# Patient Record
Sex: FEMALE | Race: ASIAN
[De-identification: names, ages, dates, MRNs, and addresses within clinical notes are randomized per-mention and may not be internally consistent; named-entity substitution may affect disease eponyms.]

---

## 2017-09-03 ENCOUNTER — HOSPITAL ENCOUNTER (OUTPATIENT)
Dept: HOSPITAL 53 - M RAD | Age: 42
End: 2017-09-03
Attending: HOSPITALIST
Payer: COMMERCIAL
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
## 2017-09-03 DIAGNOSIS — S99.912A: Primary | ICD-10-CM
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
## 2017-09-03 NOTE — REP
Clinical:  Trauma.

 

Technique:  AP, lateral, bilateral oblique views of the left ankle.

 

Findings:

Lateral soft tissue swelling consistent with inversion injury.  No acute fracture

or dislocation.  Joint spaces and ankle mortise are intact.

 

Impression:

Lateral swelling.  No acute fracture or dislocation.

 

 

Signed by

Nicolás Jorge MD 09/03/2017 09:13 A

## 2019-02-28 ENCOUNTER — HOSPITAL ENCOUNTER (OUTPATIENT)
Dept: HOSPITAL 53 - M SFHCPLAZ | Age: 44
End: 2019-02-28
Attending: FAMILY MEDICINE
Payer: COMMERCIAL

## 2019-02-28 DIAGNOSIS — Z13.220: ICD-10-CM

## 2019-02-28 DIAGNOSIS — Z13.1: ICD-10-CM

## 2019-02-28 DIAGNOSIS — R21: Primary | ICD-10-CM

## 2019-02-28 DIAGNOSIS — I10: ICD-10-CM

## 2019-02-28 LAB
ALBUMIN SERPL BCG-MCNC: 4.2 GM/DL (ref 3.2–5.2)
ALT SERPL W P-5'-P-CCNC: 49 U/L (ref 12–78)
BILIRUB SERPL-MCNC: 0.4 MG/DL (ref 0.2–1)
BUN SERPL-MCNC: 12 MG/DL (ref 7–18)
CALCIUM SERPL-MCNC: 8.8 MG/DL (ref 8.5–10.1)
CHLORIDE SERPL-SCNC: 108 MEQ/L (ref 98–107)
CHOLEST SERPL-MCNC: 210 MG/DL (ref ?–200)
CHOLEST/HDLC SERPL: 4.2 {RATIO} (ref ?–5)
CO2 SERPL-SCNC: 30 MEQ/L (ref 21–32)
CREAT SERPL-MCNC: 0.61 MG/DL (ref 0.55–1.3)
EST. AVERAGE GLUCOSE BLD GHB EST-MCNC: 128 MG/DL (ref 60–110)
GFR SERPL CREATININE-BSD FRML MDRD: > 60 ML/MIN/{1.73_M2} (ref 58–?)
GLUCOSE SERPL-MCNC: 91 MG/DL (ref 70–100)
HDLC SERPL-MCNC: 50 MG/DL (ref 40–?)
LDLC SERPL CALC-MCNC: 123 MG/DL (ref ?–100)
NONHDLC SERPL-MCNC: 160 MG/DL
POTASSIUM SERPL-SCNC: 4.1 MEQ/L (ref 3.5–5.1)
PROT SERPL-MCNC: 8 GM/DL (ref 6.4–8.2)
SODIUM SERPL-SCNC: 143 MEQ/L (ref 136–145)
TRIGL SERPL-MCNC: 183 MG/DL (ref ?–150)

## 2020-02-03 ENCOUNTER — HOSPITAL ENCOUNTER (OUTPATIENT)
Dept: HOSPITAL 53 - M LAB | Age: 45
End: 2020-02-03
Attending: FAMILY MEDICINE
Payer: COMMERCIAL

## 2020-02-03 DIAGNOSIS — R76.11: ICD-10-CM

## 2020-02-03 DIAGNOSIS — Z11.1: Primary | ICD-10-CM

## 2020-02-04 NOTE — REP
Clinical:  Pulmonary tuberculosis screening .

 

Comparison: 05/03/2010 .

 

Technique:  PA and lateral.

 

Findings:

The mediastinum and cardiac silhouette are normal.  The lung fields are clear and

without acute consolidation, effusion, or pneumothorax.  The skeletal structures

are intact and normal.

 

Impression:

1.   No acute cardiopulmonary process.

 

 

Electronically Signed by

Nicolás Jorge MD 02/04/2020 02:21 A

## 2020-04-23 ENCOUNTER — HOSPITAL ENCOUNTER (OUTPATIENT)
Dept: HOSPITAL 53 - M SFHCPLAZ | Age: 45
End: 2020-04-23
Attending: FAMILY MEDICINE
Payer: COMMERCIAL

## 2020-04-23 DIAGNOSIS — R73.03: Primary | ICD-10-CM

## 2020-04-23 LAB — EST. AVERAGE GLUCOSE BLD GHB EST-MCNC: 126 MG/DL (ref 60–110)

## 2021-01-08 ENCOUNTER — HOSPITAL ENCOUNTER (OUTPATIENT)
Dept: HOSPITAL 53 - M EMP | Age: 46
End: 2021-01-08
Attending: FAMILY MEDICINE

## 2021-01-08 DIAGNOSIS — Z20.822: Primary | ICD-10-CM

## 2021-04-26 ENCOUNTER — HOSPITAL ENCOUNTER (OUTPATIENT)
Dept: HOSPITAL 53 - M SFHCPLAZ | Age: 46
End: 2021-04-26
Attending: FAMILY MEDICINE
Payer: COMMERCIAL

## 2021-04-26 DIAGNOSIS — Z13.220: ICD-10-CM

## 2021-04-26 DIAGNOSIS — R73.03: Primary | ICD-10-CM

## 2021-04-26 LAB
BUN SERPL-MCNC: 12 MG/DL (ref 7–18)
CALCIUM SERPL-MCNC: 9.4 MG/DL (ref 8.5–10.1)
CHLORIDE SERPL-SCNC: 107 MEQ/L (ref 98–107)
CHOLEST SERPL-MCNC: 233 MG/DL (ref ?–200)
CHOLEST/HDLC SERPL: 4.16 {RATIO} (ref ?–5)
CO2 SERPL-SCNC: 31 MEQ/L (ref 21–32)
CREAT SERPL-MCNC: 0.69 MG/DL (ref 0.55–1.3)
EST. AVERAGE GLUCOSE BLD GHB EST-MCNC: 120 MG/DL (ref 60–110)
GFR SERPL CREATININE-BSD FRML MDRD: > 60 ML/MIN/{1.73_M2} (ref 58–?)
GLUCOSE SERPL-MCNC: 103 MG/DL (ref 70–100)
HDLC SERPL-MCNC: 56 MG/DL (ref 40–?)
LDLC SERPL CALC-MCNC: 147 MG/DL (ref ?–100)
NONHDLC SERPL-MCNC: 177 MG/DL
POTASSIUM SERPL-SCNC: 4.5 MEQ/L (ref 3.5–5.1)
SODIUM SERPL-SCNC: 142 MEQ/L (ref 136–145)
TRIGL SERPL-MCNC: 149 MG/DL (ref ?–150)

## 2021-05-04 ENCOUNTER — HOSPITAL ENCOUNTER (OUTPATIENT)
Dept: HOSPITAL 53 - M WHC | Age: 46
End: 2021-05-04
Attending: FAMILY MEDICINE
Payer: COMMERCIAL

## 2021-05-04 DIAGNOSIS — Z92.0: ICD-10-CM

## 2021-05-04 DIAGNOSIS — Z98.82: ICD-10-CM

## 2021-05-04 DIAGNOSIS — Z12.31: Primary | ICD-10-CM

## 2021-05-04 DIAGNOSIS — R10.11: ICD-10-CM

## 2021-05-04 NOTE — REP
INDICATION:

R10.11 RUQ ABDOMINAL PAIN.



COMPARISON:

Comparison sonography May 3, 2010..



TECHNIQUE:

Right upper quadrant sonography.



FINDINGS:

Scanning through the right upper quadrant of the abdomen demonstrates a normal sized,

thin-walled gallbladder without evidence of stone or polyp.  Common bile duct is

normal measuring 0.59 cm in greatest diameter.  No focal liver lesion is seen.  Liver

size is normal.  No pancreatic abnormality is observed.  No right renal abnormality is

seen.  There is no evidence of ascites.  The right kidney measures 11.5 x 3.4 x 3.9 cm.



IMPRESSION:

Negative right upper quadrant sonography.





<Electronically signed by Sylvain Albert > 05/04/21 1028

## 2021-05-05 NOTE — REPMRS
Patient History

The patient states she had a clinical breast exam in April 2021.

 

Patient had first child at age 36.

Family history of breast cancer at age 50 or over in mother, 

breast cancer in paternal aunt.

Retro-pectoral silicone gel implants in both breasts.

Taking hormonal contraceptives for 3 years.

No breast complaints today



1st covid vaccine 12/22/2020-left arm-[employer]

2nd covid vaccine 1/12/21-left arm

Prior done 6/29/2017 @ [facility]

 

Digital Woman Screen Mammo: May 4, 2021 - Exam #: 

[id number]

Bilateral CC and MLO view(s) were taken.

 

Technologist: Shayy Mckinney, Technologist

FINDINGS: The breast tissue is extremely dense which could 

obscure a lesion on mammography.  Screening.

 

Digital screening (2D) mammography was performed bilaterally in 

the CC and MLO projections. Additionally, breast tomosynthesis 

(3D mammography) was performed bilaterally in the CC and MLO 

projections. Todays exam was compared to the prior exams(s).Both 

Soco and non-Soco views were obtained.

 

By history, the patient has no complaints of a palpable breast 

abnormality or other significant breast complaints.

 

The breasts are unchanged in size and shape. Once again, dense 

heterogenous fibroglandular elements are seen bilaterally in a 

stable appearing pattern but to such a degree that the 

sensitivity of the mammogram in detecting cancer is 

decreased.There are no jasmyne-soft tissue densities or spiculated 

masses. There is no internal architectural distortion. There are 

no suspicious jasmyne-calcific clusters. Skin thickening or nipple 

retraction is not present.

 

IMPRESSION:

 

BI-RADS Category 2- Benign Findings(s). There is no evidence of 

malignant alteration of the breasts. Followup examination 

recommended in one year.

The Volpara volumetric breast density category is D, the breasts 

are extremely dense which lowers the sensitivity of mammography.

This mammogram was read with the assistance of iCAD PowerLook 

AMP,an FDA approved computer aided detection system for 

mammography.

The lifetime Tyrer-Cuzick score is     32.8 %

Negative x-ray reports should not delay surgical consultation if 

a dominant or clinically suspicious mass is present.

 

Not all breast cancers can be identified by mammography. 

Therefore, we recommend that you continue to perform regular 

breast self-examination and physical examination and then 

promptly contact your physician of any concerns or changes.

 

Adenosis and dense breasts may obscure an underlying neoplasm.

 

Assessment: BI-RADS/ACR category 2 mammogram. Benign Findings.

 

Recommendation

Routine screening mammogram of both breasts in 1 year.

 

Electronically Signed By: Binh Franz DO 05/05/21 [id number]

## 2021-11-10 ENCOUNTER — HOSPITAL ENCOUNTER (OUTPATIENT)
Dept: HOSPITAL 53 - M EMP | Age: 46
End: 2021-11-10
Attending: FAMILY MEDICINE

## 2021-11-10 DIAGNOSIS — Z11.52: Primary | ICD-10-CM

## 2021-11-10 DIAGNOSIS — Z20.822: ICD-10-CM

## 2021-11-10 LAB — RSV RNA NPH QL NAA+PROBE: NEGATIVE

## 2023-04-19 ENCOUNTER — HOSPITAL ENCOUNTER (OUTPATIENT)
Dept: HOSPITAL 53 - M PLAIMG | Age: 48
End: 2023-04-19
Attending: FAMILY MEDICINE
Payer: COMMERCIAL

## 2023-04-19 ENCOUNTER — HOSPITAL ENCOUNTER (OUTPATIENT)
Dept: HOSPITAL 53 - M PLALAB | Age: 48
End: 2023-04-19
Attending: FAMILY MEDICINE
Payer: COMMERCIAL

## 2023-04-19 DIAGNOSIS — Z13.220: ICD-10-CM

## 2023-04-19 DIAGNOSIS — R73.03: ICD-10-CM

## 2023-04-19 DIAGNOSIS — R41.3: ICD-10-CM

## 2023-04-19 DIAGNOSIS — S99.911A: Primary | ICD-10-CM

## 2023-04-19 DIAGNOSIS — I10: ICD-10-CM

## 2023-04-19 DIAGNOSIS — R41.3: Primary | ICD-10-CM

## 2023-04-19 LAB
ALBUMIN SERPL BCG-MCNC: 4 G/DL (ref 3.2–5.2)
ALP SERPL-CCNC: 83 U/L (ref 46–116)
ALT SERPL W P-5'-P-CCNC: 58 U/L (ref 7–40)
AST SERPL-CCNC: 37 U/L (ref ?–34)
BILIRUB SERPL-MCNC: 0.9 MG/DL (ref 0.3–1.2)
BUN SERPL-MCNC: 12 MG/DL (ref 9–23)
CALCIUM SERPL-MCNC: 9.1 MG/DL (ref 8.5–10.1)
CHLORIDE SERPL-SCNC: 106 MMOL/L (ref 98–107)
CHOLEST SERPL-MCNC: 197 MG/DL (ref ?–200)
CHOLEST/HDLC SERPL: 4.37 {RATIO} (ref ?–5)
CO2 SERPL-SCNC: 27 MMOL/L (ref 20–31)
CREAT SERPL-MCNC: 0.68 MG/DL (ref 0.55–1.3)
EST. AVERAGE GLUCOSE BLD GHB EST-MCNC: 123 MG/DL (ref 60–110)
GFR SERPL CREATININE-BSD FRML MDRD: > 60 ML/MIN/{1.73_M2} (ref 51–?)
GLUCOSE SERPL-MCNC: 92 MG/DL (ref 60–100)
HDLC SERPL-MCNC: 45 MG/DL (ref 40–?)
LDLC SERPL CALC-MCNC: 120.4 MG/DL (ref ?–100)
NONHDLC SERPL-MCNC: 152 MG/DL
POTASSIUM SERPL-SCNC: 3.9 MMOL/L (ref 3.5–5.1)
PROT SERPL-MCNC: 7.8 G/DL (ref 5.7–8.2)
SODIUM SERPL-SCNC: 139 MMOL/L (ref 136–145)
TRIGL SERPL-MCNC: 158 MG/DL (ref ?–150)
TSH SERPL DL<=0.005 MIU/L-ACNC: 0.65 UIU/ML (ref 0.55–4.78)
VIT B12 SERPL-MCNC: 665 PG/ML (ref 211–911)

## 2024-07-16 ENCOUNTER — HOSPITAL ENCOUNTER (OUTPATIENT)
Dept: HOSPITAL 53 - M PLAIMG | Age: 49
End: 2024-07-16
Attending: SURGERY
Payer: COMMERCIAL

## 2024-07-16 DIAGNOSIS — M25.552: ICD-10-CM

## 2024-07-16 DIAGNOSIS — R19.7: Primary | ICD-10-CM

## 2024-07-16 DIAGNOSIS — M25.531: ICD-10-CM

## 2024-07-16 DIAGNOSIS — M54.50: ICD-10-CM

## 2024-07-16 DIAGNOSIS — E66.3: ICD-10-CM

## 2024-07-16 LAB
ALBUMIN SERPL BCG-MCNC: 3.9 G/DL (ref 3.2–5.2)
ALP SERPL-CCNC: 85 U/L (ref 46–116)
ALT SERPL W P-5'-P-CCNC: 67 U/L (ref 7–40)
ANISOCYTOSIS BLD QL SMEAR: (no result)
AST SERPL-CCNC: 24 U/L (ref ?–34)
BILIRUB SERPL-MCNC: 0.6 MG/DL (ref 0.3–1.2)
BUN SERPL-MCNC: 9 MG/DL (ref 9–23)
CALCIUM SERPL-MCNC: 9.4 MG/DL (ref 8.5–10.1)
CHLORIDE SERPL-SCNC: 106 MMOL/L (ref 98–107)
CHOLEST SERPL-MCNC: 199 MG/DL (ref ?–200)
CHOLEST/HDLC SERPL: 6.12 {RATIO} (ref ?–5)
CO2 SERPL-SCNC: 31 MMOL/L (ref 20–31)
CREAT SERPL-MCNC: 0.72 MG/DL (ref 0.55–1.3)
CRP SERPL-MCNC: 0.6 MG/DL (ref ?–1)
EOSINOPHIL NFR BLD MANUAL: 4 % (ref 0–3)
ERYTHROCYTE [SEDIMENTATION RATE] IN BLOOD BY WESTERGREN METHOD: 42 MM/HR (ref 0–30)
EST. AVERAGE GLUCOSE BLD GHB EST-MCNC: 120 MG/DL (ref 60–110)
FT4I SERPL CALC-MCNC: 2.8 % (ref 1.3–4.8)
GFR SERPL CREATININE-BSD FRML MDRD: > 60 ML/MIN/{1.73_M2} (ref 51–?)
GLUCOSE SERPL-MCNC: 99 MG/DL (ref 60–100)
HCT VFR BLD AUTO: 42.6 % (ref 36–47)
HDLC SERPL-MCNC: 32.5 MG/DL (ref 40–?)
HGB BLD-MCNC: 13.5 G/DL (ref 12–15.5)
LDLC SERPL CALC-MCNC: 132.7 MG/DL (ref ?–100)
LYMPHOCYTES NFR BLD MANUAL: 26 % (ref 16–44)
MAGNESIUM SERPL-MCNC: 2.1 MG/DL (ref 1.8–2.4)
MCH RBC QN AUTO: 27.1 PG (ref 27–33)
MCHC RBC AUTO-ENTMCNC: 31.7 G/DL (ref 32–36.5)
MCV RBC AUTO: 85.4 FL (ref 80–96)
MONOCYTES NFR BLD MANUAL: 15 % (ref 0–5)
NEUTROPHILS NFR BLD MANUAL: 49 % (ref 28–66)
NONHDLC SERPL-MCNC: 166.5 MG/DL
PLATELET # BLD AUTO: 220 10^3/UL (ref 150–450)
PLATELET BLD QL SMEAR: NORMAL
POTASSIUM SERPL-SCNC: 4.4 MMOL/L (ref 3.5–5.1)
PROT SERPL-MCNC: 7.4 G/DL (ref 5.7–8.2)
RBC # BLD AUTO: 4.99 10^6/UL (ref 4–5.4)
SODIUM SERPL-SCNC: 141 MMOL/L (ref 136–145)
T3RU NFR SERPL: 27 % (ref 22.5–37)
T4 SERPL-MCNC: 10.4 UG/DL (ref 4.5–10.9)
TRIGL SERPL-MCNC: 169 MG/DL (ref ?–150)
TSH SERPL DL<=0.005 MIU/L-ACNC: 3.12 UIU/ML (ref 0.55–4.78)
VARIANT LYMPHS NFR BLD MANUAL: 6 % (ref 0–5)
WBC # BLD AUTO: 5.1 10^3/UL (ref 4–10)

## 2024-07-17 LAB
ANA SER QL IF: POSITIVE
ANA TITR SER IF: (no result) TITER
ANA TITR SER IF: (no result) TITER

## 2024-08-20 ENCOUNTER — HOSPITAL ENCOUNTER (OUTPATIENT)
Dept: HOSPITAL 53 - M EMP | Age: 49
End: 2024-08-20
Attending: FAMILY MEDICINE

## 2024-08-20 DIAGNOSIS — Z01.89: Primary | ICD-10-CM
